# Patient Record
Sex: MALE | Race: WHITE | NOT HISPANIC OR LATINO | Employment: FULL TIME | URBAN - METROPOLITAN AREA
[De-identification: names, ages, dates, MRNs, and addresses within clinical notes are randomized per-mention and may not be internally consistent; named-entity substitution may affect disease eponyms.]

---

## 2019-10-06 ENCOUNTER — APPOINTMENT (OUTPATIENT)
Dept: GENERAL RADIOLOGY | Facility: HOSPITAL | Age: 24
End: 2019-10-06

## 2019-10-06 PROCEDURE — 72100 X-RAY EXAM L-S SPINE 2/3 VWS: CPT | Performed by: PHYSICIAN ASSISTANT

## 2019-10-08 ENCOUNTER — HOSPITAL ENCOUNTER (EMERGENCY)
Facility: HOSPITAL | Age: 24
Discharge: LEFT BEFORE DISCHARGE | End: 2019-10-08

## 2019-10-08 NOTE — ED NOTES
Pt called for triage. The pt would not get off the phone. This nurse asked the patient 2x to get off the phone so I could  Please triage him. The pt declined. This nurse told the patient he can wait in the waiting room until he is ready to get off the phone and be triaged. The pt ambulated out of the triage area without difficulty.     Tracy Soto, RN  10/08/19 [id number]

## 2019-11-12 ENCOUNTER — OFFICE VISIT (OUTPATIENT)
Dept: FAMILY MEDICINE CLINIC | Facility: CLINIC | Age: 24
End: 2019-11-12

## 2019-11-12 VITALS
HEART RATE: 59 BPM | WEIGHT: 141 LBS | OXYGEN SATURATION: 99 % | SYSTOLIC BLOOD PRESSURE: 120 MMHG | DIASTOLIC BLOOD PRESSURE: 70 MMHG | HEIGHT: 71 IN | BODY MASS INDEX: 19.74 KG/M2 | TEMPERATURE: 98.4 F

## 2019-11-12 DIAGNOSIS — M54.16 LUMBAR RADICULOPATHY: Primary | ICD-10-CM

## 2019-11-12 PROCEDURE — 99214 OFFICE O/P EST MOD 30 MIN: CPT | Performed by: NURSE PRACTITIONER

## 2019-11-12 RX ORDER — IBUPROFEN 800 MG/1
800 TABLET ORAL EVERY 8 HOURS PRN
Qty: 90 TABLET | Refills: 1 | Status: SHIPPED | OUTPATIENT
Start: 2019-11-12 | End: 2020-11-02

## 2019-11-12 NOTE — PROGRESS NOTES
"Subjective   Eduardo Lion is a 24 y.o. male.       Pleasant gentleman here today c/o rle pain  3 weeks  Evaluate urgent care muscle relaxers and steroids  Helped quite a bit especially ibuprofen  Much better nearly gone still has some pain at times  No bowel bladder changes no fever no chills no unexplained weight loss or night sweats no history of IV drug  He also needs a new PCP and wants to establish care    Social history  Single  No drug or alcohol abuse no tobacco  Past medical history healthy        Sciatica   Pertinent negatives include no abdominal pain, chest pain, coughing, fatigue or fever.        /70   Pulse 59   Temp 98.4 °F (36.9 °C) (Oral)   Ht 180.3 cm (71\")   Wt 64 kg (141 lb)   SpO2 99%   BMI 19.67 kg/m²       The following portions of the patient's history were reviewed and updated as appropriate: allergies, current medications, past family history, past medical history, past social history, past surgical history and problem list.    Review of Systems   Constitutional: Negative for fatigue and fever.   HENT: Negative.  Negative for trouble swallowing.    Eyes: Negative.    Respiratory: Negative.  Negative for cough and shortness of breath.    Cardiovascular: Negative for chest pain, palpitations and leg swelling.   Gastrointestinal: Negative.  Negative for abdominal pain.   Genitourinary: Negative.    Musculoskeletal: Negative.         Leg pain back pain   Skin: Negative.    Neurological: Negative.  Negative for dizziness and confusion.   Psychiatric/Behavioral: Negative.    All other systems reviewed and are negative.      Objective   Physical Exam   Constitutional: He is oriented to person, place, and time. He appears well-developed and well-nourished. No distress.   HENT:   Head: Normocephalic and atraumatic.   Nose: Nose normal.   Mouth/Throat: Oropharynx is clear and moist.   Eyes: Conjunctivae are normal. Pupils are equal, round, and reactive to light.   Neck: Neck supple. No JVD " present.   Cardiovascular: Normal rate, regular rhythm and normal heart sounds.   No murmur heard.  Pulmonary/Chest: Effort normal and breath sounds normal. No respiratory distress. He has no wheezes.   Abdominal: Soft. Bowel sounds are normal. He exhibits no distension and no mass. There is no tenderness. There is no guarding. No hernia.   Musculoskeletal: He exhibits no edema or tenderness.   Negative straight leg raise plantar flexion dorsiflexion normal normal gait   Lymphadenopathy:     He has no cervical adenopathy.   Neurological: He is alert and oriented to person, place, and time.   Skin: Skin is warm and dry. He is not diaphoretic.   Psychiatric: He has a normal mood and affect. His behavior is normal. Judgment and thought content normal.   Vitals reviewed.        Assessment/Plan   Eduardo was seen today for establish care and sciatica.    Diagnoses and all orders for this visit:    Lumbar radiculopathy  Comments:  Right-sided sciatica acute    Other orders  -     ibuprofen (ADVIL,MOTRIN) 800 MG tablet; Take 1 tablet by mouth Every 8 (Eight) Hours As Needed for Moderate Pain . With food and water      Discussed the pathophysiology with sciatica   Injury prevention  Back exercises  If needed short duration ibuprofen  Lowest effective dose shortest time possible  Recheck if his pain is not completely resolved in 3 weeks  Weakness bowel or bladder incontinence saddlebag paresthesias fever chills emergency room  Yearly physical  Fasting labs          There are no Patient Instructions on file for this visit.

## 2019-11-12 NOTE — PATIENT INSTRUCTIONS
Discharge instructions    Ibuprofen 800 mg with food and water increase water  Pepcid OTC as some GI upset or stop ibuprofen  Take this continuously for 2 to 3 weeks then discontinue or take it as needed with caution  Avoid chronic use  Due to risk  Gradually start back exercises as instructed  Work around the pain  Follow-up in 3 weeks if your pain is not resolved  As well as I will refer you to physical therapy  If it is not resolved after physical therapy we will get an MRI    Weakness bowel or bladder incontinence or retention groin paresthesias  Emergency room    Yearly physical        Back Exercises  The following exercises strengthen the muscles that help to support the back. They also help to keep the lower back flexible. Doing these exercises can help to prevent back pain or lessen existing pain.  If you have back pain or discomfort, try doing these exercises 2-3 times each day or as told by your health care provider. When the pain goes away, do them once each day, but increase the number of times that you repeat the steps for each exercise (do more repetitions). If you do not have back pain or discomfort, do these exercises once each day or as told by your health care provider.  Exercises  Single Knee to Chest  Repeat these steps 3-5 times for each le. Lie on your back on a firm bed or the floor with your legs extended.  2. Bring one knee to your chest. Your other leg should stay extended and in contact with the floor.  3. Hold your knee in place by grabbing your knee or thigh.  4. Pull on your knee until you feel a gentle stretch in your lower back.  5. Hold the stretch for 10-30 seconds.  6. Slowly release and straighten your leg.  Pelvic Tilt  Repeat these steps 5-10 times:  1. Lie on your back on a firm bed or the floor with your legs extended.  2. Bend your knees so they are pointing toward the ceiling and your feet are flat on the floor.  3. Tighten your lower abdominal muscles to press your  lower back against the floor. This motion will tilt your pelvis so your tailbone points up toward the ceiling instead of pointing to your feet or the floor.  4. With gentle tension and even breathing, hold this position for 5-10 seconds.  Cat-Cow  Repeat these steps until your lower back becomes more flexible:  1. Get into a hands-and-knees position on a firm surface. Keep your hands under your shoulders, and keep your knees under your hips. You may place padding under your knees for comfort.  2. Let your head hang down, and point your tailbone toward the floor so your lower back becomes rounded like the back of a cat.  3. Hold this position for 5 seconds.  4. Slowly lift your head and point your tailbone up toward the ceiling so your back forms a sagging arch like the back of a cow.  5. Hold this position for 5 seconds.    Press-Ups  Repeat these steps 5-10 times:  1. Lie on your abdomen (face-down) on the floor.  2. Place your palms near your head, about shoulder-width apart.  3. While you keep your back as relaxed as possible and keep your hips on the floor, slowly straighten your arms to raise the top half of your body and lift your shoulders. Do not use your back muscles to raise your upper torso. You may adjust the placement of your hands to make yourself more comfortable.  4. Hold this position for 5 seconds while you keep your back relaxed.  5. Slowly return to lying flat on the floor.    Bridges  Repeat these steps 10 times:  1. Lie on your back on a firm surface.  2. Bend your knees so they are pointing toward the ceiling and your feet are flat on the floor.  3. Tighten your buttocks muscles and lift your buttocks off of the floor until your waist is at almost the same height as your knees. You should feel the muscles working in your buttocks and the back of your thighs. If you do not feel these muscles, slide your feet 1-2 inches farther away from your buttocks.  4. Hold this position for 3-5  seconds.  5. Slowly lower your hips to the starting position, and allow your buttocks muscles to relax completely.  If this exercise is too easy, try doing it with your arms crossed over your chest.  Abdominal Crunches  Repeat these steps 5-10 times:  1. Lie on your back on a firm bed or the floor with your legs extended.  2. Bend your knees so they are pointing toward the ceiling and your feet are flat on the floor.  3. Cross your arms over your chest.  4. Tip your chin slightly toward your chest without bending your neck.  5. Tighten your abdominal muscles and slowly raise your trunk (torso) high enough to lift your shoulder blades a tiny bit off of the floor. Avoid raising your torso higher than that, because it can put too much stress on your low back and it does not help to strengthen your abdominal muscles.  6. Slowly return to your starting position.  Back Lifts  Repeat these steps 5-10 times:  1. Lie on your abdomen (face-down) with your arms at your sides, and rest your forehead on the floor.  2. Tighten the muscles in your legs and your buttocks.  3. Slowly lift your chest off of the floor while you keep your hips pressed to the floor. Keep the back of your head in line with the curve in your back. Your eyes should be looking at the floor.  4. Hold this position for 3-5 seconds.  5. Slowly return to your starting position.  Contact a health care provider if:  · Your back pain or discomfort gets much worse when you do an exercise.  · Your back pain or discomfort does not lessen within 2 hours after you exercise.  If you have any of these problems, stop doing these exercises right away. Do not do them again unless your health care provider says that you can.  Get help right away if:  · You develop sudden, severe back pain. If this happens, stop doing the exercises right away. Do not do them again unless your health care provider says that you can.  This information is not intended to replace advice given to  you by your health care provider. Make sure you discuss any questions you have with your health care provider.  Document Released: 01/25/2006 Document Revised: 04/23/2019 Document Reviewed: 02/11/2016  [employer] Interactive Patient Education © 2019 [employer]    Sciatica Rehab  Ask your health care provider which exercises are safe for you. Do exercises exactly as told by your health care provider and adjust them as directed. It is normal to feel mild stretching, pulling, tightness, or discomfort as you do these exercises, but you should stop right away if you feel sudden pain or your pain gets worse. Do not begin these exercises until told by your health care provider.  Stretching and range of motion exercises  These exercises warm up your muscles and joints and improve the movement and flexibility of your hips and your back. These exercises also help to relieve pain, numbness, and tingling.  Exercise A: Sciatic nerve glide  1. Sit in a chair with your head facing down toward your chest. Place your hands behind your back. Let your shoulders slump forward.  2. Slowly straighten one of your knees while you tilt your head back as if you are looking toward the ceiling. Only straighten your leg as far as you can without making your symptoms worse.  3. Hold for __________ seconds.  4. Slowly return to the starting position.  5. Repeat with your other leg.  Repeat __________ times. Complete this exercise __________ times a day.  Exercise B: Knee to chest with hip adduction and internal rotation    1. Lie on your back on a firm surface with both legs straight.  2. Bend one of your knees and move it up toward your chest until you feel a gentle stretch in your lower back and buttock. Then, move your knee toward the shoulder that is on the opposite side from your leg.  ? Hold your leg in this position by holding onto the front of your knee.  3. Hold for __________ seconds.  4. Slowly return to the starting  position.  5. Repeat with your other leg.  Repeat __________ times. Complete this exercise __________ times a day.  Exercise C: Prone extension on elbows    1. Lie on your abdomen on a firm surface. A bed may be too soft for this exercise.  2. Prop yourself up on your elbows.  3. Use your arms to help lift your chest up until you feel a gentle stretch in your abdomen and your lower back.  ? This will place some of your body weight on your elbows. If this is uncomfortable, try stacking pillows under your chest.  ? Your hips should stay down, against the surface that you are lying on. Keep your hip and back muscles relaxed.  4. Hold for __________ seconds.  5. Slowly relax your upper body and return to the starting position.  Repeat __________ times. Complete this exercise __________ times a day.  Strengthening exercises  These exercises build strength and endurance in your back. Endurance is the ability to use your muscles for a long time, even after they get tired.  Exercise D: Pelvic tilt  1. Lie on your back on a firm surface. Bend your knees and keep your feet flat.  2. Tense your abdominal muscles. Tip your pelvis up toward the ceiling and flatten your lower back into the floor.  ? To help with this exercise, you may place a small towel under your lower back and try to push your back into the towel.  3. Hold for __________ seconds.  4. Let your muscles relax completely before you repeat this exercise.  Repeat __________ times. Complete this exercise __________ times a day.  Exercise E: Alternating arm and leg raises    1. Get on your hands and knees on a firm surface. If you are on a hard floor, you may want to use padding to cushion your knees, such as an exercise mat.  2. Line up your arms and legs. Your hands should be below your shoulders, and your knees should be below your hips.  3. Lift your left leg behind you. At the same time, raise your right arm and straighten it in front of you.  ? Do not lift your  leg higher than your hip.  ? Do not lift your arm higher than your shoulder.  ? Keep your abdominal and back muscles tight.  ? Keep your hips facing the ground.  ? Do not arch your back.  ? Keep your balance carefully, and do not hold your breath.  4. Hold for __________ seconds.  5. Slowly return to the starting position and repeat with your right leg and your left arm.  Repeat __________ times. Complete this exercise __________ times a day.  Posture and body mechanics    Body mechanics refers to the movements and positions of your body while you do your daily activities. Posture is part of body mechanics. Good posture and healthy body mechanics can help to relieve stress in your body's tissues and joints. Good posture means that your spine is in its natural S-curve position (your spine is neutral), your shoulders are pulled back slightly, and your head is not tipped forward. The following are general guidelines for applying improved posture and body mechanics to your everyday activities.  Standing    · When standing, keep your spine neutral and your feet about hip-width apart. Keep a slight bend in your knees. Your ears, shoulders, and hips should line up.  · When you do a task in which you  one place for a long time, place one foot up on a stable object that is 2-4 inches (5-10 cm) high, such as a footstool. This helps keep your spine neutral.  Sitting    · When sitting, keep your spine neutral and keep your feet flat on the floor. Use a footrest, if necessary, and keep your thighs parallel to the floor. Avoid rounding your shoulders, and avoid tilting your head forward.  · When working at a desk or a computer, keep your desk at a height where your hands are slightly lower than your elbows. Slide your chair under your desk so you are close enough to maintain good posture.  · When working at a computer, place your monitor at a height where you are looking straight ahead and you do not have to tilt your  head forward or downward to look at the screen.  Resting    · When lying down and resting, avoid positions that are most painful for you.  · If you have pain with activities such as sitting, bending, stooping, or squatting (flexion-based activities), lie in a position in which your body does not bend very much. For example, avoid curling up on your side with your arms and knees near your chest (fetal position).  · If you have pain with activities such as standing for a long time or reaching with your arms (extension-based activities), lie with your spine in a neutral position and bend your knees slightly. Try the following positions:  ? Lying on your side with a pillow between your knees.  ? Lying on your back with a pillow under your knees.  Lifting    · When lifting objects, keep your feet at least shoulder-width apart and tighten your abdominal muscles.  · Bend your knees and hips and keep your spine neutral. It is important to lift using the strength of your legs, not your back. Do not lock your knees straight out.  · Always ask for help to lift heavy or awkward objects.  This information is not intended to replace advice given to you by your health care provider. Make sure you discuss any questions you have with your health care provider.  Document Released: 12/18/2006 Document Revised: 08/24/2017 Document Reviewed: 09/02/2016  [employer] Interactive Patient Education © 2019 [employer]

## 2020-11-02 ENCOUNTER — OFFICE VISIT (OUTPATIENT)
Dept: FAMILY MEDICINE CLINIC | Facility: CLINIC | Age: 25
End: 2020-11-02

## 2020-11-02 ENCOUNTER — TELEPHONE (OUTPATIENT)
Dept: FAMILY MEDICINE CLINIC | Facility: CLINIC | Age: 25
End: 2020-11-02

## 2020-11-02 VITALS
SYSTOLIC BLOOD PRESSURE: 103 MMHG | TEMPERATURE: 98.4 F | BODY MASS INDEX: 18.2 KG/M2 | HEIGHT: 71 IN | OXYGEN SATURATION: 100 % | DIASTOLIC BLOOD PRESSURE: 69 MMHG | WEIGHT: 130 LBS | HEART RATE: 84 BPM

## 2020-11-02 DIAGNOSIS — M54.50 ACUTE RIGHT-SIDED LOW BACK PAIN WITHOUT SCIATICA: Primary | ICD-10-CM

## 2020-11-02 PROCEDURE — 99213 OFFICE O/P EST LOW 20 MIN: CPT | Performed by: NURSE PRACTITIONER

## 2020-11-02 RX ORDER — TIZANIDINE 2 MG/1
TABLET ORAL
COMMUNITY
Start: 2020-10-29

## 2020-11-02 RX ORDER — METHYLPREDNISOLONE 4 MG/1
TABLET ORAL
Qty: 21 TABLET | Refills: 0 | Status: SHIPPED | OUTPATIENT
Start: 2020-11-02 | End: 2021-05-12

## 2020-11-02 NOTE — PATIENT INSTRUCTIONS
Discharge instructions  Hold naproxen start Medrol Dosepak now  When completed take naproxen OTC 2 tablets twice daily for up to 3 weeks until symptoms resolve  After 1 week start exercises low back  Follow-up in 3 weeks unless pec pain is resolved if so please notify me    Incontinence bowel bladder change fever chills back pain groin paresthesia urinary retention incontinence emergency room or weakness      Back Injury Prevention  Back injuries can be very painful. They can also be difficult to heal. After having one back injury, you are more likely to have another one again. It is important to learn how to avoid injuring or re-injuring your back. The following tips can help you to prevent a back injury.  What actions can I take to prevent back injuries?  Nutrition changes  Talk with your health care provider about your overall diet, and especially about foods that strengthen your bones.  · Ask your health care provider how much calcium and vitamin D you need each day. These nutrients help to prevent weakening of the bones (osteoporosis). Osteoporosis can cause broken (fractured) bones, which lead to back pain.  · Eat foods that are good sources of calcium. These include dairy products, green leafy vegetables, and products that have had calcium added to them (fortified).  · Eat foods that are good sources of vitamin D. These include milk and foods that are fortified with vitamin D.  · If needed, take supplements and vitamins as directed by your health care provider.  Physical fitness  Physical fitness strengthens your bones and your muscles. It also increases your balance and strength.  · Exercise for 30 minutes per day on most days of the week, or as directed by your health care provider. Make sure to:  ? Do aerobic exercises, such as walking, jogging, biking, or swimming.  ? Do exercises that increase balance and strength, such as xenia chi and yoga. These can decrease your risk of falling and injuring your  back.  ? Do stretching exercises to help with flexibility.  ? Develop strong abdominal muscles. Your abdominal muscles provide a lot of the support that your back needs.  · Maintain a healthy weight. This helps to decrease your risk of a back injury.  Good posture              Prevent back injuries by developing and maintaining a good posture. To do this successfully:  · Sit up and stand up straight. Avoid leaning forward when you sit or hunching over when you stand.  · Choose chairs that have good low-back (lumbar) support.  · If you work at a desk, sit close to it so you do not need to lean over. Keep your chin tucked in. Keep your neck drawn back, and keep your elbows bent at a right angle.  · Sit high and close to the steering wheel when you drive. Add a lumbar support to your car seat, if needed.  · Avoid sitting or standing in one position for very long. Take breaks to get up, stretch, and walk around at least one time every hour. Take breaks every hour if you are driving for long periods of time.  · Sleep on your side with your knees slightly bent, or sleep on your back with a pillow under your knees.    Lifting, twisting, and reaching  Back injuries are more likely to occur when carrying loads and twisting at the same time. When you bend and lift, or reach for items that are high up in shelves, use positions that put less stress on your back.  · Heavy lifting  ? Avoid heavy lifting, especially the kind of heavy lifting that is repetitive. If you must do heavy lifting:  ? Stretch before lifting.  ? Work slowly.  ? Rest between lifts.  ? Use a tool such as a cart or a aziza to move objects.  ? Make several small trips instead of carrying one heavy load.  ? Ask for help when you need it, especially when moving big or heavy objects.  ? Follow these steps when lifting:  ? Stand with your feet shoulder-width apart.  ? Get as close to the object as you can. Do not try to  a heavy object that is far from your  body.  ? Use handles or lifting straps if they are available.  ? Bend at your knees. Squat down, but keep your heels off the floor.  ? Keep your shoulders pulled back, your chin tucked in, and your back straight.  ? Lift the object slowly while you tighten the muscles in your legs, abdomen, and buttocks. Keep the object as close to the center of your body as possible.  ? Follow these steps when putting down a heavy load:  ? Stand with your feet shoulder-width apart.  ? Lower the object slowly while you tighten the muscles in your legs, abdomen, and buttocks. Keep the object as close to the center of your body as possible.  ? Keep your shoulders pulled back, your chin tucked in, and your back straight.  ? Bend at your knees. Squat down, but keep your heels off the floor.  ? Use handles or lifting straps if they are available.  · Twisting and reaching  ? Avoid lifting heavy objects above your waist.  ? Do not twist at your waist while you are lifting or carrying a load. If you need to turn, move your feet.  ? Do not bend over without bending at your knees.  ? Avoid reaching over your head, across a table, or for an object on a high surface.    Other changes    · Avoid wet floors and icy ground. Keep sidewalks clear of ice to prevent falls.  · Do not sleep on a mattress that is too soft or too hard.  · Put heavier objects on shelves at waist level, and put lighter objects on lower or higher shelves.  · Find ways to decrease your stress, such as by exercising, getting a massage, or practicing relaxation techniques. Stress can build up in your muscles. Tense muscles are more vulnerable to injury.  · Talk with your health care provider if you feel anxious or depressed. These conditions can make back pain worse.  · Wear flat heel shoes with cushioned soles.  · Use both shoulder straps when carrying a backpack.  · Do not use any products that contain nicotine or tobacco, such as cigarettes and e-cigarettes. If you need help  quitting, ask your health care provider.  Summary  · Back injuries can be very painful and difficult to heal.  · You can prevent injuring or re-injuring your back by making nutrition changes, working on being physically fit, developing a good posture, and lifting heavy objects in a safe way.  · Making other changes can also help to prevent back injuries. These include eating a healthy diet, exercising regularly and maintaining a healthy weight.  This information is not intended to replace advice given to you by your health care provider. Make sure you discuss any questions you have with your health care provider.  Document Released: 2006 Document Revised: 2020 Document Reviewed: 2019  [employer] Patient Education ©  [employer]    Back Exercises  The following exercises strengthen the muscles that help to support the trunk and back. They also help to keep the lower back flexible. Doing these exercises can help to prevent back pain or lessen existing pain.  · If you have back pain or discomfort, try doing these exercises 2-3 times each day or as told by your health care provider.  · As your pain improves, do them once each day, but increase the number of times that you repeat the steps for each exercise (do more repetitions).  · To prevent the recurrence of back pain, continue to do these exercises once each day or as told by your health care provider.  Do exercises exactly as told by your health care provider and adjust them as directed. It is normal to feel mild stretching, pulling, tightness, or discomfort as you do these exercises, but you should stop right away if you feel sudden pain or your pain gets worse.  Exercises  Single knee to chest  Repeat these steps 3-5 times for each le. Lie on your back on a firm bed or the floor with your legs extended.  2. Bring one knee to your chest. Your other leg should stay extended and in contact with the floor.  3. Hold your knee in place by  grabbing your knee or thigh with both hands and hold.  4. Pull on your knee until you feel a gentle stretch in your lower back or buttocks.  5. Hold the stretch for 10-30 seconds.  6. Slowly release and straighten your leg.  Pelvic tilt  Repeat these steps 5-10 times:  1. Lie on your back on a firm bed or the floor with your legs extended.  2. Bend your knees so they are pointing toward the ceiling and your feet are flat on the floor.  3. Tighten your lower abdominal muscles to press your lower back against the floor. This motion will tilt your pelvis so your tailbone points up toward the ceiling instead of pointing to your feet or the floor.  4. With gentle tension and even breathing, hold this position for 5-10 seconds.  Cat-cow  Repeat these steps until your lower back becomes more flexible:  1. Get into a hands-and-knees position on a firm surface. Keep your hands under your shoulders, and keep your knees under your hips. You may place padding under your knees for comfort.  2. Let your head hang down toward your chest. Contract your abdominal muscles and point your tailbone toward the floor so your lower back becomes rounded like the back of a cat.  3. Hold this position for 5 seconds.  4. Slowly lift your head, let your abdominal muscles relax and point your tailbone up toward the ceiling so your back forms a sagging arch like the back of a cow.  5. Hold this position for 5 seconds.    Press-ups  Repeat these steps 5-10 times:  1. Lie on your abdomen (face-down) on the floor.  2. Place your palms near your head, about shoulder-width apart.  3. Keeping your back as relaxed as possible and keeping your hips on the floor, slowly straighten your arms to raise the top half of your body and lift your shoulders. Do not use your back muscles to raise your upper torso. You may adjust the placement of your hands to make yourself more comfortable.  4. Hold this position for 5 seconds while you keep your back  relaxed.  5. Slowly return to lying flat on the floor.    Bridges  Repeat these steps 10 times:  1. Lie on your back on a firm surface.  2. Bend your knees so they are pointing toward the ceiling and your feet are flat on the floor. Your arms should be flat at your sides, next to your body.  3. Tighten your buttocks muscles and lift your buttocks off the floor until your waist is at almost the same height as your knees. You should feel the muscles working in your buttocks and the back of your thighs. If you do not feel these muscles, slide your feet 1-2 inches farther away from your buttocks.  4. Hold this position for 3-5 seconds.  5. Slowly lower your hips to the starting position, and allow your buttocks muscles to relax completely.  If this exercise is too easy, try doing it with your arms crossed over your chest.  Abdominal crunches  Repeat these steps 5-10 times:  1. Lie on your back on a firm bed or the floor with your legs extended.  2. Bend your knees so they are pointing toward the ceiling and your feet are flat on the floor.  3. Cross your arms over your chest.  4. Tip your chin slightly toward your chest without bending your neck.  5. Tighten your abdominal muscles and slowly raise your trunk (torso) high enough to lift your shoulder blades a tiny bit off the floor. Avoid raising your torso higher than that because it can put too much stress on your low back and does not help to strengthen your abdominal muscles.  6. Slowly return to your starting position.  Back lifts  Repeat these steps 5-10 times:  1. Lie on your abdomen (face-down) with your arms at your sides, and rest your forehead on the floor.  2. Tighten the muscles in your legs and your buttocks.  3. Slowly lift your chest off the floor while you keep your hips pressed to the floor. Keep the back of your head in line with the curve in your back. Your eyes should be looking at the floor.  4. Hold this position for 3-5 seconds.  5. Slowly return  to your starting position.  Contact a health care provider if:  · Your back pain or discomfort gets much worse when you do an exercise.  · Your worsening back pain or discomfort does not lessen within 2 hours after you exercise.  If you have any of these problems, stop doing these exercises right away. Do not do them again unless your health care provider says that you can.  Get help right away if:  · You develop sudden, severe back pain. If this happens, stop doing the exercises right away. Do not do them again unless your health care provider says that you can.  This information is not intended to replace advice given to you by your health care provider. Make sure you discuss any questions you have with your health care provider.  Document Released: 01/25/2006 Document Revised: 04/23/2020 Document Reviewed: 09/19/2019  [employer]vier Patient Education © 2020 [employer]

## 2020-11-02 NOTE — TELEPHONE ENCOUNTER
PT CALLED TO PROVIDE A FAX NUMBER FOR HIS [facility] PAPERWORK TO BE SENT TO HIS EMPLOYER.    FAX -[phone], ATTN: XAVI MACHADO    CALLBACK NUMBER: [phone]

## 2020-11-02 NOTE — PROGRESS NOTES
"Subjective   Eduardo Lion is a 24 y.o. male.     Pleasant gentleman here today complains of recent flareup of left low back pain strain symptoms similar occurrence 1 year ago he came in here and he had got exercises and over the last year he had near resolvent of his symptoms about 85% pretty much they had resolved but would wax and wane occasionally but mild to no symptoms  Unfortunately recently he reached over to  something   Did not really lift much to cause this last injury he has no bowel bladder changes no groin paresthesias no fevers no chills no risk factors for osteomyelitis  Take naproxen helping a little bit he was given a muscle relaxer in the urgent care    Patient has a very physical job quite a bit of lifting surprisingly has not hurt his back at work this was done on home and he really was not lifting much  We like to go back to work as soon as possible does not think he can do his job today due to restrictive range of motion and pain           /69   Pulse 84   Temp 98.4 °F (36.9 °C) (Temporal)   Ht 180.3 cm (70.98\")   Wt 59 kg (130 lb)   SpO2 100%   BMI 18.14 kg/m²       The following portions of the patient's history were reviewed and updated as appropriate: allergies, current medications, past family history, past medical history, past social history, past surgical history and problem list.    Review of Systems   Constitutional: Negative for fatigue and fever.   HENT: Negative.  Negative for trouble swallowing.    Eyes: Negative.    Respiratory: Negative.  Negative for cough and shortness of breath.    Cardiovascular: Negative for chest pain, palpitations and leg swelling.   Gastrointestinal: Negative.  Negative for abdominal pain.   Genitourinary: Negative.    Musculoskeletal: Negative.    Skin: Negative.    Neurological: Negative.  Negative for dizziness and confusion.   Psychiatric/Behavioral: Negative.        Objective   Physical Exam  Vitals signs reviewed.   Constitutional: "       Appearance: He is well-developed.   HENT:      Head: Normocephalic and atraumatic.   Eyes:      Conjunctiva/sclera: Conjunctivae normal.      Pupils: Pupils are equal, round, and reactive to light.   Neck:      Musculoskeletal: Neck supple.   Pulmonary:      Effort: Pulmonary effort is normal.   Musculoskeletal:      Comments: No lumbar tenderness, deep tendon reflexes normal negative straight leg raise plantar flexion dorsiflexion normal   Skin:     General: Skin is warm and dry.      Findings: No erythema or rash.   Neurological:      General: No focal deficit present.      Mental Status: He is oriented to person, place, and time.      Cranial Nerves: No cranial nerve deficit.      Sensory: No sensory deficit.      Motor: No weakness.      Coordination: Coordination normal.      Gait: Gait normal.      Deep Tendon Reflexes: Reflexes normal.   Psychiatric:         Behavior: Behavior normal.         Thought Content: Thought content normal.         Judgment: Judgment normal.            Assessment/Plan   Diagnoses and all orders for this visit:    1. Acute right-sided low back pain without sciatica (Primary)      Exercises  See handout  NSAID trial  Physical therapy  Follow-up in 1 month  Weakness incontinence bowel bladder incontinence retention groin paresthesias fever back pain emergency room  Further work-up including MRI if patient does not continue to improve over the next couple weeks                There are no Patient Instructions on file for this visit.

## 2020-11-19 ENCOUNTER — TELEPHONE (OUTPATIENT)
Dept: FAMILY MEDICINE CLINIC | Facility: CLINIC | Age: 25
End: 2020-11-19

## 2020-11-19 RX ORDER — IBUPROFEN 800 MG/1
800 TABLET ORAL EVERY 8 HOURS PRN
Qty: 90 TABLET | Refills: 1 | Status: SHIPPED | OUTPATIENT
Start: 2020-11-19 | End: 2021-03-08 | Stop reason: SDUPTHER

## 2020-11-19 NOTE — TELEPHONE ENCOUNTER
Caller: Eduardo Lion    Relationship: Self    Best call back number: [phone]    What medication are you requesting: IBUPROFEN 800 MG   What are your current symptoms: SCIATICA PAIN      If a prescription is needed, what is your preferred pharmacy and phone number: [facility] #[id number] [place], [place] - [address] AT [facility] - [phone] [facility] [phone]

## 2021-03-08 RX ORDER — IBUPROFEN 800 MG/1
800 TABLET ORAL EVERY 8 HOURS PRN
Qty: 90 TABLET | Refills: 0 | Status: SHIPPED | OUTPATIENT
Start: 2021-03-08 | End: 2021-04-05 | Stop reason: SDUPTHER

## 2021-04-05 RX ORDER — IBUPROFEN 800 MG/1
800 TABLET ORAL EVERY 8 HOURS PRN
Qty: 90 TABLET | Refills: 0 | Status: SHIPPED | OUTPATIENT
Start: 2021-04-05 | End: 2021-08-16 | Stop reason: SDUPTHER

## 2021-05-04 ENCOUNTER — OFFICE VISIT (OUTPATIENT)
Dept: FAMILY MEDICINE CLINIC | Facility: CLINIC | Age: 26
End: 2021-05-04

## 2021-05-04 VITALS
OXYGEN SATURATION: 98 % | WEIGHT: 129.2 LBS | HEIGHT: 71 IN | BODY MASS INDEX: 18.09 KG/M2 | SYSTOLIC BLOOD PRESSURE: 119 MMHG | DIASTOLIC BLOOD PRESSURE: 79 MMHG | TEMPERATURE: 97.3 F | HEART RATE: 85 BPM

## 2021-05-04 DIAGNOSIS — M54.50 INTERMITTENT LOW BACK PAIN: ICD-10-CM

## 2021-05-04 DIAGNOSIS — M54.50 MIDLINE LOW BACK PAIN WITHOUT SCIATICA, UNSPECIFIED CHRONICITY: Primary | ICD-10-CM

## 2021-05-04 PROCEDURE — 99213 OFFICE O/P EST LOW 20 MIN: CPT | Performed by: NURSE PRACTITIONER

## 2021-05-04 RX ORDER — TIZANIDINE 2 MG/1
TABLET ORAL
Status: CANCELLED | OUTPATIENT
Start: 2021-05-04

## 2021-05-12 PROBLEM — M54.50 INTERMITTENT LOW BACK PAIN: Status: ACTIVE | Noted: 2021-05-12

## 2021-05-12 PROBLEM — M54.50 MIDLINE LOW BACK PAIN WITHOUT SCIATICA: Status: ACTIVE | Noted: 2021-05-12

## 2021-05-12 NOTE — PROGRESS NOTES
"Chief Complaint  FMLA paperwork    Subjective          Eduardo Lion presents to [facility]  Pleasant gentleman here today follow-up chronic intermittent low back pain, he has no back pain presently but his job requires heavy lifting at times and sometimes it triggers back pain he cannot finish his job, he needs an FMLA paperwork completed is been done before as well he has no fevers chills incontinence bowel bladder change no saddle paresthesias no acute problems.  No history of malignancy no fever chills or risk factors for osteomyelitis      Objective   Vital Signs:   /79   Pulse 85   Temp 97.3 °F (36.3 °C) (Temporal)   Ht 180.3 cm (70.98\")   Wt 58.6 kg (129 lb 3.2 oz)   SpO2 98%   BMI 18.03 kg/m²     Physical Exam  Vitals reviewed.   Constitutional:       Appearance: He is well-developed.   HENT:      Head: Normocephalic.      Nose: Nose normal.   Eyes:      General: No scleral icterus.     Conjunctiva/sclera: Conjunctivae normal.      Pupils: Pupils are equal, round, and reactive to light.   Neck:      Thyroid: No thyromegaly.      Vascular: No JVD.   Cardiovascular:      Rate and Rhythm: Normal rate and regular rhythm.      Heart sounds: Normal heart sounds. No murmur heard.   No friction rub. No gallop.    Pulmonary:      Effort: Pulmonary effort is normal. No respiratory distress.      Breath sounds: Normal breath sounds. No stridor. No wheezing or rales.   Abdominal:      General: Bowel sounds are normal. There is no distension.      Palpations: Abdomen is soft.      Tenderness: There is no abdominal tenderness.      Comments: No hepatosplenomegaly, no ascites,   Musculoskeletal:         General: No tenderness.      Cervical back: Neck supple.      Comments: Negative straight leg raise plantar flexion dorsiflexion normal   Lymphadenopathy:      Cervical: No cervical adenopathy.   Skin:     General: Skin is warm and dry.      Findings: No erythema or rash. "   Neurological:      Mental Status: He is alert and oriented to person, place, and time.      Deep Tendon Reflexes: Reflexes are normal and symmetric.   Psychiatric:         Behavior: Behavior normal.         Thought Content: Thought content normal.         Judgment: Judgment normal.        Result Review :                 Assessment and Plan    There are no diagnoses linked to this encounter.    Follow Up   No follow-ups on file.  Patient was given instructions and counseling regarding his condition or for health maintenance advice. Please see specific information pulled into the AVS if appropriate.     FMLA completed healthy diet regular exercise strengthening back muscles regular exercises, lifting precautions avoid heavy lifting  Occasional ibuprofen avoid chronic use  Always take with food and water lowest effective dose shortest time possible risk-benefit

## 2021-08-16 DIAGNOSIS — M54.50 MIDLINE LOW BACK PAIN WITHOUT SCIATICA, UNSPECIFIED CHRONICITY: Primary | ICD-10-CM

## 2021-08-16 RX ORDER — IBUPROFEN 800 MG/1
800 TABLET ORAL EVERY 8 HOURS PRN
Qty: 90 TABLET | Refills: 0 | Status: SHIPPED | OUTPATIENT
Start: 2021-08-16 | End: 2021-09-24

## 2021-08-16 NOTE — TELEPHONE ENCOUNTER
Caller: Eduardo Lion    Relationship: Self    Best call back number: [phone]    Medication needed:   Requested Prescriptions     Pending Prescriptions Disp Refills   • ibuprofen (ADVIL,MOTRIN) 800 MG tablet 90 tablet 0     Sig: Take 1 tablet by mouth Every 8 (Eight) Hours As Needed for Moderate Pain .       When do you need the refill by: ASAP     What additional details did the patient provide when requesting the medication: PATIENT IS OUT OF MEDICATION     Does the patient have less than a 3 day supply:  [x] Yes  [] No    What is the patient's preferred pharmacy: [facility] #[id number] [facility] [address] [address] AT [facility] [phone] [facility] [phone]

## 2021-08-16 NOTE — TELEPHONE ENCOUNTER
Please set patient up for outpatient lab testing    CBC  CMP  Uric acid  Sed rate  CRP    Diagnosis chronic low back pain  Midline without  High risk medication    Explained to patient we need to get labs  And check his kidney function based on the anti-inflammatory ibuprofen  Thank you

## 2021-08-16 NOTE — TELEPHONE ENCOUNTER
Rx Refill Note  Requested Prescriptions     Pending Prescriptions Disp Refills   • ibuprofen (ADVIL,MOTRIN) 800 MG tablet 90 tablet 0     Sig: Take 1 tablet by mouth Every 8 (Eight) Hours As Needed for Moderate Pain .      Last office visit with prescribing clinician: 5/4/2021      Next office visit with prescribing clinician: 5/4/2022            Shante Virgen MA  08/16/21, 15:11 EDT

## 2021-08-17 DIAGNOSIS — M54.50 CHRONIC MIDLINE LOW BACK PAIN, UNSPECIFIED WHETHER SCIATICA PRESENT: Primary | ICD-10-CM

## 2021-08-17 DIAGNOSIS — G89.29 CHRONIC MIDLINE LOW BACK PAIN, UNSPECIFIED WHETHER SCIATICA PRESENT: Primary | ICD-10-CM

## 2021-08-17 NOTE — TELEPHONE ENCOUNTER
LVM.    Hub please inform patient if call back:  Please schedule patient for labs and appointment to continue this/these medications.  Orders in chart

## 2021-09-24 RX ORDER — IBUPROFEN 800 MG/1
800 TABLET ORAL EVERY 8 HOURS PRN
Qty: 90 TABLET | Refills: 0 | Status: SHIPPED | OUTPATIENT
Start: 2021-09-24 | End: 2021-10-06 | Stop reason: SDUPTHER

## 2021-09-24 NOTE — TELEPHONE ENCOUNTER
Rx Refill Note  Requested Prescriptions     Pending Prescriptions Disp Refills   • ibuprofen (ADVIL,MOTRIN) 800 MG tablet [Pharmacy Med Name: IBUPROFEN 800MG TABLETS] 90 tablet 0     Sig: TAKE 1 TABLET BY MOUTH EVERY 8 HOURS AS NEEDED FOR MODERATE PAIN      Last office visit with prescribing clinician: 5/4/2021      Next office visit with prescribing clinician: 5/4/2022            Silvestre Washington Rep  09/24/21, 11:19 EDT

## 2021-10-06 RX ORDER — IBUPROFEN 800 MG/1
800 TABLET ORAL EVERY 8 HOURS PRN
Qty: 90 TABLET | Refills: 0 | Status: SHIPPED | OUTPATIENT
Start: 2021-10-06 | End: 2021-12-13 | Stop reason: SDUPTHER

## 2021-10-06 NOTE — TELEPHONE ENCOUNTER
Incoming Refill Request      Medication requested (name and dose): ibuprofen (ADVIL,MOTRIN) 800 MG tablet    Pharmacy where request should be sent: [facility] #[id number] [facility] [address] [address] AT [facility] [address] - [phone] [facility] [phone]      Additional details provided by patient: PLEASE ADVISE PATIENT WHEN SENT TO PHARMACY ASAP    Best call back number: [phone] (M)    Does the patient have less than a 3 day supply:  [x] Yes  [] No    Silvestre Renner Rep  10/06/21, 15:58 EDT

## 2021-10-06 NOTE — TELEPHONE ENCOUNTER
Rx Refill Note  Requested Prescriptions     Pending Prescriptions Disp Refills   • ibuprofen (ADVIL,MOTRIN) 800 MG tablet 90 tablet 0     Sig: Take 1 tablet by mouth Every 8 (Eight) Hours As Needed for Moderate Pain . Use sparingly with food and large glass of water      Last office visit with prescribing clinician: 5/4/2021      Next office visit with prescribing clinician: 5/4/2022            Silvestre Washington Rep  10/06/21, 16:54 EDT

## 2021-12-13 RX ORDER — IBUPROFEN 800 MG/1
800 TABLET ORAL EVERY 8 HOURS PRN
Qty: 90 TABLET | Refills: 0 | Status: SHIPPED | OUTPATIENT
Start: 2021-12-13 | End: 2022-01-12

## 2021-12-13 NOTE — TELEPHONE ENCOUNTER
DID NOT PASS PROTOCOLS      Rx Refill Note  Requested Prescriptions     Pending Prescriptions Disp Refills   • ibuprofen (ADVIL,MOTRIN) 800 MG tablet 90 tablet 0     Sig: Take 1 tablet by mouth Every 8 (Eight) Hours As Needed for Moderate Pain . Use sparingly with food and large glass of water      Last office visit with prescribing clinician: 5/4/2021      Next office visit with prescribing clinician: 5/4/2022            Lissett Brown MA  12/13/21, 15:18 EST

## 2021-12-13 NOTE — TELEPHONE ENCOUNTER
Caller: Eduardo Lion    Relationship: Self    Best call back number: [phone]    Requested Prescriptions:   Requested Prescriptions     Pending Prescriptions Disp Refills   • ibuprofen (ADVIL,MOTRIN) 800 MG tablet 90 tablet 0     Sig: Take 1 tablet by mouth Every 8 (Eight) Hours As Needed for Moderate Pain . Use sparingly with food and large glass of water      Pharmacy where request should be sent: [id number] [address] [facility] AT [facility] [phone] [facility] [phone]      Additional details provided by patient:     Does the patient have less than a 3 day supply:  [x] Yes  [] No    Silvestre Rojas Rep   12/13/21 11:13 EST

## 2022-01-12 RX ORDER — IBUPROFEN 800 MG/1
TABLET ORAL
Qty: 90 TABLET | Refills: 0 | Status: SHIPPED | OUTPATIENT
Start: 2022-01-12 | End: 2022-01-25 | Stop reason: SDUPTHER

## 2022-01-25 ENCOUNTER — OFFICE VISIT (OUTPATIENT)
Dept: FAMILY MEDICINE CLINIC | Facility: CLINIC | Age: 27
End: 2022-01-25

## 2022-01-25 VITALS
RESPIRATION RATE: 12 BRPM | HEART RATE: 83 BPM | BODY MASS INDEX: 19.18 KG/M2 | DIASTOLIC BLOOD PRESSURE: 77 MMHG | HEIGHT: 71 IN | TEMPERATURE: 97.3 F | WEIGHT: 137 LBS | SYSTOLIC BLOOD PRESSURE: 117 MMHG | OXYGEN SATURATION: 100 %

## 2022-01-25 DIAGNOSIS — G89.29 CHRONIC MIDLINE LOW BACK PAIN WITHOUT SCIATICA: Primary | ICD-10-CM

## 2022-01-25 DIAGNOSIS — M54.50 CHRONIC MIDLINE LOW BACK PAIN WITHOUT SCIATICA: Primary | ICD-10-CM

## 2022-01-25 PROCEDURE — 99213 OFFICE O/P EST LOW 20 MIN: CPT | Performed by: NURSE PRACTITIONER

## 2022-01-25 RX ORDER — IBUPROFEN 800 MG/1
800 TABLET ORAL EVERY 8 HOURS PRN
Qty: 90 TABLET | Refills: 0 | Status: SHIPPED | OUTPATIENT
Start: 2022-01-25

## 2022-01-25 NOTE — PROGRESS NOTES
"Chief Complaint  FMLA and Med Refill    Subjective          Eduardo Lion presents to [facility]  Pleasant gentleman here today with history of chronic back pain the last 3 to 4 years, he has occasional intermittent back pain several times throughout the year but usually exacerbated by work, when it occurs mid and low back pain  Without radiation in his lower level between L4 L5-S1.  He has no chronic daily pain and if he watches ergonomics which he does he takes great effort to prevent injury he usually does fine.  But occasionally or rarely  It can get aggravated, and during these times he is unable to work due to moderate to moderate to severe pain, and difficulty walking bending stooping or sitting.  Recently he had missed a day because his back was flared up and was able to go when he had called in sick, he was told he need to come in here to discuss his FMLA  He does not fact presently have intermittent FMLA for these occasions  Nonetheless he will get of FMLA paperwork completed so I can complete this for him  And make sure were clear that he has intermittent leave for occasional back flareup  He likely has a bulging disc, at this time he does not wish to have an MRI was offered to have him risk and benefit of his lumbar spine he had x-ray showing some  Recently had to use his FMLA holidays before , his human resources said he needs to return office to repeat his FMLA  For intermittent upon review closer inspection it looks as though I approved intermittent but is hard to determine this due to the preprint form        Objective   Vital Signs:   /77   Pulse 83   Temp 97.3 °F (36.3 °C) (Infrared)   Resp 12   Ht 180.3 cm (70.98\")   Wt 62.1 kg (137 lb)   SpO2 100%   BMI 19.12 kg/m²     Physical Exam  Vitals reviewed.   Constitutional:       Appearance: Normal appearance. He is well-developed. He is not ill-appearing or diaphoretic.   HENT:      Head: Normocephalic.     "  Nose: Nose normal.   Eyes:      General: No scleral icterus.     Conjunctiva/sclera: Conjunctivae normal.      Pupils: Pupils are equal, round, and reactive to light.   Neck:      Thyroid: No thyromegaly.      Vascular: No JVD.   Cardiovascular:      Rate and Rhythm: Normal rate and regular rhythm.      Heart sounds: Normal heart sounds. No murmur heard.  No friction rub. No gallop.    Pulmonary:      Effort: Pulmonary effort is normal. No respiratory distress.      Breath sounds: Normal breath sounds. No stridor. No wheezing or rales.   Abdominal:      General: Bowel sounds are normal. There is no distension.      Palpations: Abdomen is soft.      Tenderness: There is no abdominal tenderness.      Comments: No hepatosplenomegaly, no ascites,   Musculoskeletal:         General: No swelling, tenderness, deformity or signs of injury. Normal range of motion.      Cervical back: Neck supple.      Right lower leg: No edema.      Left lower leg: No edema.   Lymphadenopathy:      Cervical: No cervical adenopathy.   Skin:     General: Skin is warm and dry.      Findings: No erythema or rash.   Neurological:      Mental Status: He is alert and oriented to person, place, and time.      Deep Tendon Reflexes: Reflexes are normal and symmetric.   Psychiatric:         Mood and Affect: Mood normal.         Behavior: Behavior normal.         Thought Content: Thought content normal.         Judgment: Judgment normal.        Result Review :                 Assessment and Plan    Diagnoses and all orders for this visit:    1. Chronic midline low back pain without sciatica (Primary)  Comments:  Chronic intermittent mid and left lower back pain exacerbated by increased work.      I spent 20  minutes caring for Eduardo on this date of service. This time includes time spent by me in the following activities:obtaining and/or reviewing a separately obtained history, performing a medically appropriate examination and/or evaluation ,  counseling and educating the patient/family/caregiver, referring and communicating with other health care professionals , documenting information in the medical record and care coordination  Follow Up   No follow-ups on file.  Patient was given instructions and counseling regarding his condition or for health maintenance advice. Please see specific information pulled into the AVS if appropriate.     Risk-benefit NSAID lowest effective dose shortest time possible take with food and extra water need lab monitoring we will get labs today annual physical as well continue healthy diet regular exercise and increase in back pain worsening symptoms further evaluation including MRI    Already approved for intermittent FMLA  Please send a new FMLA form for recertification now    Patient has legitimate back pain, he declines an MRI at this time as it several times year associated work and never any red flags of weakness bowel bladder changes fevers chills should he have increasing more frequent pain should prompt us for a work-up including MRI and physical therapy.      He should continue back exercises ergonomics extreme good measures to prevent any back injury during lifting    Lowest effective dose shortest time possible any NSAIDs due to severe and significant risk      Discharge instructions  Ergonomics  Injury prevention critical    For flareup of your back, will follow FMLA  For approval of occasional back pain see new form when completed    Should you have fever severe excruciating pain weakness bowel or bladder incontinence retention or saddlebag paresthesias emergency room    If you have a new or chronic worsening pain then return to office for further evaluation as well as an MRI of the lumbar spine.    As long as you are doing well follow-up yearly, with your FMLA work for recertification  If you want to return to office for a physical at that time we can do a yearly physical as well as your FMLA if you  like

## 2022-01-31 ENCOUNTER — OFFICE VISIT (OUTPATIENT)
Dept: FAMILY MEDICINE CLINIC | Facility: CLINIC | Age: 27
End: 2022-01-31

## 2022-01-31 VITALS
SYSTOLIC BLOOD PRESSURE: 115 MMHG | HEART RATE: 82 BPM | HEIGHT: 71 IN | RESPIRATION RATE: 12 BRPM | BODY MASS INDEX: 19.44 KG/M2 | DIASTOLIC BLOOD PRESSURE: 60 MMHG | TEMPERATURE: 97.5 F | WEIGHT: 138.9 LBS | OXYGEN SATURATION: 99 %

## 2022-01-31 DIAGNOSIS — M54.50 INTERMITTENT LOW BACK PAIN: Primary | ICD-10-CM

## 2022-01-31 PROCEDURE — 99212 OFFICE O/P EST SF 10 MIN: CPT | Performed by: NURSE PRACTITIONER

## 2022-01-31 NOTE — PROGRESS NOTES
"Chief Complaint  FMLA    Subjective          Eduardo Lion presents to [facility]  Pleasant gentleman here today to discuss FMLA his chronic intermittent low back pain exacerbated his present job, has no unexplained weight loss no night sweats.  No new complaints  No fevers chills or weight loss as above  No bowel bladder changes, which tries to watch to where he lives to prevent injuries.      Objective   Vital Signs:   /60   Pulse 82   Temp 97.5 °F (36.4 °C) (Infrared)   Resp 12   Ht 180.3 cm (70.98\")   Wt 63 kg (138 lb 14.4 oz)   SpO2 99%   BMI 19.38 kg/m²     Physical Exam  Vitals reviewed.   Constitutional:       Appearance: Normal appearance.   Eyes:      Pupils: Pupils are equal, round, and reactive to light.   Pulmonary:      Effort: Pulmonary effort is normal.   Skin:     General: Skin is warm.   Neurological:      General: No focal deficit present.      Mental Status: He is alert and oriented to person, place, and time.   Psychiatric:         Mood and Affect: Mood normal.        Result Review :                 Assessment and Plan    Diagnoses and all orders for this visit:    1. Intermittent low back pain (Primary)      I spent 10  minutes caring for Eduardo on this date of service. This time includes time spent by me in the following activities:preparing for the visit, performing a medically appropriate examination and/or evaluation , counseling and educating the patient/family/caregiver, documenting information in the medical record and care coordination  Follow Up   No follow-ups on file.  Patient was given instructions and counseling regarding his condition or for health maintenance advice. Please see specific information pulled into the AVS if appropriate.     Completed updated FMLA paper reminded patient precautions to avoid injuries ergonomics during a flareup he can take off 1 to 3 days up to 1 to 3 days/month only as needed.  Increased back pain worsening " symptoms emergency room he does not wish any further work-up or MRI at this time  He is having no new or progressing back pain if so we should urgent recheck further evaluation continue exercises ergonomics

## 2022-06-13 ENCOUNTER — APPOINTMENT (OUTPATIENT)
Dept: GENERAL RADIOLOGY | Facility: HOSPITAL | Age: 27
End: 2022-06-13

## 2022-06-13 ENCOUNTER — HOSPITAL ENCOUNTER (EMERGENCY)
Facility: HOSPITAL | Age: 27
Discharge: HOME | End: 2022-06-13
Attending: EMERGENCY MEDICINE | Admitting: EMERGENCY MEDICINE

## 2022-06-13 VITALS
SYSTOLIC BLOOD PRESSURE: 113 MMHG | HEIGHT: 71 IN | HEART RATE: 67 BPM | TEMPERATURE: 96.5 F | WEIGHT: 135 LBS | BODY MASS INDEX: 18.9 KG/M2 | RESPIRATION RATE: 18 BRPM | OXYGEN SATURATION: 99 % | DIASTOLIC BLOOD PRESSURE: 74 MMHG

## 2022-06-13 DIAGNOSIS — S42.002A CLOSED DISPLACED FRACTURE OF LEFT CLAVICLE, UNSPECIFIED PART OF CLAVICLE, INITIAL ENCOUNTER: Primary | ICD-10-CM

## 2022-06-13 PROCEDURE — 73000 X-RAY EXAM OF COLLAR BONE: CPT

## 2022-06-13 PROCEDURE — 99283 EMERGENCY DEPT VISIT LOW MDM: CPT

## 2022-06-13 RX ORDER — HYDROCODONE BITARTRATE AND ACETAMINOPHEN 5; 325 MG/1; MG/1
1 TABLET ORAL EVERY 4 HOURS PRN
Qty: 12 TABLET | Refills: 0 | Status: SHIPPED | OUTPATIENT
Start: 2022-06-13

## 2022-06-13 RX ORDER — HYDROCODONE BITARTRATE AND ACETAMINOPHEN 7.5; 325 MG/1; MG/1
1 TABLET ORAL ONCE
Status: COMPLETED | OUTPATIENT
Start: 2022-06-13 | End: 2022-06-13

## 2022-06-13 RX ADMIN — HYDROCODONE BITARTRATE AND ACETAMINOPHEN 1 TABLET: 7.5; 325 TABLET ORAL at 10:20
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
## 2022-06-13 NOTE — DISCHARGE INSTRUCTIONS
Medications as ordered  Home to rest  Ice to painful areas for 20 minutes at a time, 4 times a day  Elevated to help reduce swelling, wear sling  Tylenol or Motrin as needed for mild-moderate pain-take as instructed on package  Follow up with Dr. Fuentes, if you do not hear from his office today be at his office at 8 AM in the morning  Return to er for any worsening or new concerns including increased pain or swelling

## 2023-02-02 ENCOUNTER — TELEPHONE (OUTPATIENT)
Dept: FAMILY MEDICINE CLINIC | Facility: CLINIC | Age: 28
End: 2023-02-02

## 2023-02-02 NOTE — TELEPHONE ENCOUNTER
Caller: Eduardo Lion    Relationship: Self    Best call back number: [phone]    What was the call regarding: PATIENT NEEDS HIS FMLA PAPERWORK FILLED OUT, DOES HE NEED TO KEEP HIS APPOINTMENT FOR PARVEEN TO FILL THAT OUT OR CAN HE JUST DROP THE PAPERWORK OFF? PLEASE ADVISE.     Do you require a callback: YES

## 2023-02-02 NOTE — TELEPHONE ENCOUNTER
Spoke to pt in detail letting pt know to keep his appointment next week to have FMLA paperwork filled out. Pt voiced his understanding

## 2023-02-08 ENCOUNTER — OFFICE VISIT (OUTPATIENT)
Dept: FAMILY MEDICINE CLINIC | Facility: CLINIC | Age: 28
End: 2023-02-08
Payer: COMMERCIAL

## 2023-02-08 VITALS
BODY MASS INDEX: 18.98 KG/M2 | HEIGHT: 71 IN | RESPIRATION RATE: 12 BRPM | SYSTOLIC BLOOD PRESSURE: 100 MMHG | DIASTOLIC BLOOD PRESSURE: 71 MMHG | TEMPERATURE: 97.3 F | WEIGHT: 135.6 LBS | OXYGEN SATURATION: 99 % | HEART RATE: 86 BPM

## 2023-02-08 DIAGNOSIS — Z00.00 HEALTH MAINTENANCE EXAMINATION: ICD-10-CM

## 2023-02-08 DIAGNOSIS — G89.29 CHRONIC MIDLINE LOW BACK PAIN, UNSPECIFIED WHETHER SCIATICA PRESENT: Primary | ICD-10-CM

## 2023-02-08 DIAGNOSIS — M54.50 CHRONIC MIDLINE LOW BACK PAIN, UNSPECIFIED WHETHER SCIATICA PRESENT: Primary | ICD-10-CM

## 2023-02-08 PROCEDURE — 99213 OFFICE O/P EST LOW 20 MIN: CPT | Performed by: NURSE PRACTITIONER

## 2023-02-08 NOTE — PATIENT INSTRUCTIONS
Discharge instructions  Continue daily back exercises and stretching and prevention  Perfect form with lifting  FMLA as needed    Outpatient MRI lumbar spine follow-up for results call few days later    Otherwise conservative measures for back relief heat or ice as needed rest as needed and occasional ibuprofen OTC but avoid any chronic use    Increase healthy proteins healthy fiber healthy calories about 300 extra day  Try to  10 pounds over the next year or so of healthy muscle weight.    As long as doing well yearly physical and fasting labs thank you  Severe back pain uncontrolled back pain weakness groin paresthesia severe back pain fever emergency room

## 2023-02-08 NOTE — PROGRESS NOTES
"Chief Complaint  FMLA    Subjective        Eduardo Loin presents to [facility]  History of Present Illness  Very pleasant gentleman here today with his brandie mother, he has some chronic low back pain usually in the mornings and at work.  Not too bad it can be around a 5 he had this for several years.  Despite exercises he does daily for his back.  Anti-inflammatories in the past have not resolved his pain.  It can be aggravated by work so he is very careful to do proper lifting and to prevent any injuries at work.  But does need FMLA completion should he have a bad day.  X-rays several years ago essentially normal he has not had an MRI no risk factors for osteomyelitis no IV drug use, no history of malignancy, he had no fevers chills night sweats unexplained weight loss or recurrent infection    He has been making good decisions, no tobacco drug or alcohol abuse,  He does date, presently but not , looking for the right one.  Past medical history as above.            Objective   Vital Signs:  /71   Pulse 86   Temp 97.3 °F (36.3 °C) (Infrared)   Resp 12   Ht 180.3 cm (71\")   Wt 61.5 kg (135 lb 9.6 oz)   SpO2 99%   BMI 18.91 kg/m²   Estimated body mass index is 18.91 kg/m² as calculated from the following:    Height as of this encounter: 180.3 cm (71\").    Weight as of this encounter: 61.5 kg (135 lb 9.6 oz).       BMI is within normal parameters. No other follow-up for BMI required.      Physical Exam  Vitals reviewed.   Constitutional:       General: He is not in acute distress.     Appearance: Normal appearance. He is well-developed. He is not ill-appearing, toxic-appearing or diaphoretic.      Comments: Pleasant appears well, no distress   HENT:      Head: Normocephalic.      Nose: Nose normal.      Mouth/Throat:      Mouth: Mucous membranes are moist.   Eyes:      General: No scleral icterus.     Conjunctiva/sclera: Conjunctivae normal.      Pupils: Pupils are " equal, round, and reactive to light.   Neck:      Thyroid: No thyromegaly.      Vascular: No JVD.   Cardiovascular:      Rate and Rhythm: Normal rate and regular rhythm.      Heart sounds: Normal heart sounds. No murmur heard.    No friction rub. No gallop.   Pulmonary:      Effort: Pulmonary effort is normal. No respiratory distress.      Breath sounds: Normal breath sounds. No stridor. No wheezing or rales.   Abdominal:      General: Bowel sounds are normal. There is no distension.      Palpations: Abdomen is soft.      Tenderness: There is no abdominal tenderness.      Comments: No hepatosplenomegaly, no ascites,   Musculoskeletal:         General: No tenderness.      Cervical back: Neck supple.      Comments: Negative straight leg raise plantarflexion dorsiflexion normal.nml gait    Lymphadenopathy:      Cervical: No cervical adenopathy.   Skin:     General: Skin is warm and dry.      Findings: No erythema or rash.   Neurological:      General: No focal deficit present.      Mental Status: He is alert and oriented to person, place, and time.      Deep Tendon Reflexes: Reflexes are normal and symmetric.   Psychiatric:         Mood and Affect: Mood normal.         Behavior: Behavior normal.         Thought Content: Thought content normal.         Judgment: Judgment normal.        Result Review :                   Assessment and Plan   Diagnoses and all orders for this visit:    1. Health maintenance examination (Primary)  -     CBC & Differential  -     Comprehensive Metabolic Panel  -     Lipid Panel With LDL / HDL Ratio  -     TSH Rfx On Abnormal To Free T4  -     Urinalysis With Microscopic If Indicated (No Culture) - Urine, Clean Catch    2. Chronic midline low back pain, unspecified whether sciatica present  -     MRI Lumbar Spine Without Contrast             Follow Up   Return Labs today please, for Annual physical.  Patient was given instructions and counseling regarding his condition or for health  maintenance advice. Please see specific information pulled into the AVS if appropriate.     Discharge instructions  Continue daily back exercises and stretching and prevention  Perfect form with lifting  FMLA as needed    Outpatient MRI lumbar spine follow-up for results call few days later    Otherwise conservative measures for back relief heat or ice as needed rest as needed and occasional ibuprofen OTC but avoid any chronic use    Increase healthy proteins healthy fiber healthy calories about 300 extra day  Try to  10 pounds over the next year or so of healthy muscle weight.    As long as doing well yearly physical and fasting labs thank you  Severe back pain uncontrolled back pain weakness groin paresthesia severe back pain fever emergency room

## 2023-04-25 ENCOUNTER — OFFICE VISIT (OUTPATIENT)
Dept: FAMILY MEDICINE CLINIC | Facility: CLINIC | Age: 28
End: 2023-04-25
Payer: COMMERCIAL

## 2023-04-25 VITALS
SYSTOLIC BLOOD PRESSURE: 116 MMHG | RESPIRATION RATE: 14 BRPM | HEART RATE: 74 BPM | HEIGHT: 71 IN | WEIGHT: 138.8 LBS | OXYGEN SATURATION: 99 % | TEMPERATURE: 97 F | BODY MASS INDEX: 19.43 KG/M2 | DIASTOLIC BLOOD PRESSURE: 69 MMHG

## 2023-04-25 DIAGNOSIS — R51.9 FREQUENT HEADACHES: ICD-10-CM

## 2023-04-25 DIAGNOSIS — M54.50 INTERMITTENT LOW BACK PAIN: Primary | ICD-10-CM

## 2023-04-25 PROCEDURE — 99214 OFFICE O/P EST MOD 30 MIN: CPT | Performed by: NURSE PRACTITIONER

## 2023-04-25 RX ORDER — IBUPROFEN 800 MG/1
800 TABLET ORAL EVERY 8 HOURS PRN
Qty: 90 TABLET | Refills: 2 | Status: SHIPPED | OUTPATIENT
Start: 2023-04-25

## 2023-04-25 NOTE — PATIENT INSTRUCTIONS
Discharge instructions  Continue great ergonomics  Injury prevention  Proactive,  Lift with legs never back  If flareup of your back, has been impinged more recently,  If acute exacerbation FMLA will give you some off time  Recheck as needed,    If headache take ibuprofen 800 to go relax massage, your head as needed,  Prevent headache from getting established if increased frequency of headaches return to office for further evaluation including MRI of the brain, worst headache ever severe persistent headache emergency room    Otherwise always lowest effective dose shortest time possible ibuprofen for your back, as long as you are doing well follow-up annual physical annual labs.

## 2023-04-25 NOTE — PROGRESS NOTES
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  We ordered MRI previously but his back improved and did not require this he had x-rays in the past he has no night sweats unexplained weight loss no saddlebag paresthesias or weakness no acute pain presently  He did have an incidental headache a couple over the last couple weeks throbbing,  But never worst headache ever no headache presently does sound like he may have had a migraine, if he has another headache he has a plan with ibuprofen and he will return to office any increase or new onset headaches    Social history  no drug or alcohol or tobacco abuse  Past medical history no change.    Back Pain        Objective   Vital Signs:  /69   Pulse 74   Temp 97 °F (36.1 °C) (Temporal)   Resp 14   Ht 180.3 cm (71\")   Wt 63 kg (138 lb 12.8 oz)   SpO2 99%   BMI 19.36 kg/m²   Estimated body mass index is 19.36 kg/m² as calculated from the following:    Height as of this encounter: 180.3 cm (71\").    Weight as of this encounter: 63 kg (138 lb 12.8 oz).    BMI is within normal parameters. No other follow-up for BMI required.      Physical Exam  Vitals reviewed.   Constitutional:       General: He is not in acute distress.     Appearance: He is well-developed. He is not diaphoretic.   HENT:      Head: Normocephalic and atraumatic.      Nose: Nose normal.   Eyes:      Conjunctiva/sclera: Conjunctivae normal.      Pupils: Pupils are equal, round, and reactive to light.   Neck:      Vascular: No JVD.   Cardiovascular:      Rate and Rhythm: Normal rate and regular rhythm.      Heart sounds: Normal " heart sounds. No murmur heard.  Pulmonary:      Effort: Pulmonary effort is normal. No respiratory distress.      Breath sounds: Normal breath sounds. No wheezing.   Abdominal:      General: Bowel sounds are normal. There is no distension.      Palpations: Abdomen is soft. There is no mass.      Tenderness: There is no abdominal tenderness. There is no guarding.      Hernia: No hernia is present.   Musculoskeletal:         General: No tenderness.      Cervical back: Neck supple.   Lymphadenopathy:      Cervical: No cervical adenopathy.   Skin:     General: Skin is warm and dry.   Neurological:      General: No focal deficit present.      Mental Status: He is alert and oriented to person, place, and time. Mental status is at baseline.      Cranial Nerves: No cranial nerve deficit.      Sensory: No sensory deficit.      Motor: No weakness.      Coordination: Coordination normal.      Deep Tendon Reflexes: Reflexes normal.   Psychiatric:         Mood and Affect: Mood normal.         Behavior: Behavior normal.         Thought Content: Thought content normal.         Judgment: Judgment normal.        Result Review :                Assessment and Plan   Diagnoses and all orders for this visit:    1. Intermittent low back pain (Primary)    2. Frequent headaches  Comments:  couple only resolved    Other orders  -     ibuprofen (ADVIL,MOTRIN) 800 MG tablet; Take 1 tablet by mouth Every 8 (Eight) Hours As Needed for Moderate Pain. With food and water, use sparingly  Dispense: 90 tablet; Refill: 2             Follow Up   Return for Annual physical, Labs today, Labs before next visit.  Patient was given instructions and counseling regarding his condition or for health maintenance advice. Please see specific information pulled into the AVS if appropriate.   If any new onset headaches  Return office further evaluation is severe persistent worst headache emergency room  MRI for any new onset headaches  Otherwise he will follow  instructions below      Patient Instructions   Discharge instructions  Continue great ergonomics  Injury prevention  Proactive,  Lift with legs never back  If flareup of your back, has been impinged more recently,  If acute exacerbation FMLA will give you some off time  Recheck as needed,    If headache take ibuprofen 800 to go relax massage, your head as needed,  Prevent headache from getting established if increased frequency of headaches return to office for further evaluation including MRI of the brain, worst headache ever severe persistent headache emergency room    Otherwise always lowest effective dose shortest time possible ibuprofen for your back, as long as you are doing well follow-up annual physical annual labs.

## 2024-01-25 NOTE — PATIENT INSTRUCTIONS
Discharge instructions  Ergonomics  Injury prevention critical    For flareup of your back, will follow FMLA  For approval of occasional back pain see new form when completed    Should you have fever severe excruciating pain weakness bowel or bladder incontinence retention or saddlebag paresthesias emergency room    If you have a new or chronic worsening pain then return to office for further evaluation as well as an MRI of the lumbar spine.    As long as you are doing well follow-up yearly, with your FMLA work for recertification  If you want to return to office for a physical at that time we can do a yearly physical as well as your FMLA if you like                 Pt to meet >75% of estimated needs; improved appetite/intake.

## 2024-08-22 ENCOUNTER — HOSPITAL ENCOUNTER (EMERGENCY)
Facility: HOSPITAL | Age: 29
Discharge: HOME | End: 2024-08-22
Attending: EMERGENCY MEDICINE
Payer: COMMERCIAL

## 2024-08-22 ENCOUNTER — APPOINTMENT (OUTPATIENT)
Dept: CT IMAGING | Facility: HOSPITAL | Age: 29
End: 2024-08-22
Payer: COMMERCIAL

## 2024-08-22 VITALS
TEMPERATURE: 98.8 F | HEIGHT: 71 IN | DIASTOLIC BLOOD PRESSURE: 79 MMHG | BODY MASS INDEX: 23.1 KG/M2 | OXYGEN SATURATION: 98 % | HEART RATE: 86 BPM | WEIGHT: 165 LBS | RESPIRATION RATE: 18 BRPM | SYSTOLIC BLOOD PRESSURE: 115 MMHG

## 2024-08-22 DIAGNOSIS — H11.32 SUBCONJUNCTIVAL HEMORRHAGE OF LEFT EYE: ICD-10-CM

## 2024-08-22 DIAGNOSIS — S01.81XA LACERATION OF FOREHEAD, INITIAL ENCOUNTER: Primary | ICD-10-CM

## 2024-08-22 DIAGNOSIS — S00.83XA CONTUSION OF FACE, INITIAL ENCOUNTER: ICD-10-CM

## 2024-08-22 DIAGNOSIS — S09.90XA CLOSED HEAD INJURY, INITIAL ENCOUNTER: ICD-10-CM

## 2024-08-22 PROCEDURE — 70486 CT MAXILLOFACIAL W/O DYE: CPT

## 2024-08-22 PROCEDURE — 99284 EMERGENCY DEPT VISIT MOD MDM: CPT

## 2024-08-22 PROCEDURE — 70450 CT HEAD/BRAIN W/O DYE: CPT

## 2024-08-22 RX ORDER — LIDOCAINE 40 MG/G
1 CREAM TOPICAL ONCE
Status: COMPLETED | OUTPATIENT
Start: 2024-08-22 | End: 2024-08-22

## 2024-08-22 RX ADMIN — LIDOCAINE 4% 1 APPLICATION: 4 CREAM TOPICAL at 20:49
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
## 2024-08-29 ENCOUNTER — HOSPITAL ENCOUNTER (EMERGENCY)
Facility: HOSPITAL | Age: 29
Discharge: HOME | End: 2024-08-29
Attending: EMERGENCY MEDICINE
Payer: MEDICAID

## 2024-08-29 VITALS
DIASTOLIC BLOOD PRESSURE: 80 MMHG | HEART RATE: 78 BPM | OXYGEN SATURATION: 98 % | WEIGHT: 165 LBS | TEMPERATURE: 98.2 F | HEIGHT: 71 IN | BODY MASS INDEX: 23.1 KG/M2 | SYSTOLIC BLOOD PRESSURE: 125 MMHG | RESPIRATION RATE: 18 BRPM

## 2024-08-29 DIAGNOSIS — S03.40XD TMJ (SPRAIN OF TEMPOROMANDIBULAR JOINT), SUBSEQUENT ENCOUNTER: ICD-10-CM

## 2024-08-29 DIAGNOSIS — H21.02 HYPHEMA OF LEFT EYE: ICD-10-CM

## 2024-08-29 DIAGNOSIS — H11.33 SUBCONJUNCTIVAL HEMORRHAGE OF BOTH EYES: ICD-10-CM

## 2024-08-29 DIAGNOSIS — Z48.02 VISIT FOR SUTURE REMOVAL: Primary | ICD-10-CM

## 2024-08-29 DIAGNOSIS — M26.622 ARTHRALGIA OF LEFT TEMPOROMANDIBULAR JOINT: ICD-10-CM

## 2024-08-29 PROCEDURE — 99282 EMERGENCY DEPT VISIT SF MDM: CPT | Performed by: EMERGENCY MEDICINE
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
## 2024-08-29 NOTE — DISCHARGE INSTRUCTIONS
Rest and increase fluids.  Call Rosalina first thing in the morning to schedule a appointment for evaluation of your eyes.  Continue your Motrin 3 times daily with food consistently for the next 3 to 5 days.  Eat soft foods until you are jaw feels better.  Return to the emergency department if symptoms get worse or change.  Keep wound clean and dry.